# Patient Record
Sex: FEMALE | Race: WHITE | URBAN - METROPOLITAN AREA
[De-identification: names, ages, dates, MRNs, and addresses within clinical notes are randomized per-mention and may not be internally consistent; named-entity substitution may affect disease eponyms.]

---

## 2017-02-06 ENCOUNTER — ALLIED HEALTH/NURSE VISIT (OUTPATIENT)
Dept: OPHTHALMOLOGY | Facility: CLINIC | Age: 67
End: 2017-02-06

## 2017-02-06 DIAGNOSIS — H40.1131 PRIMARY OPEN ANGLE GLAUCOMA OF BOTH EYES, MILD STAGE: Primary | ICD-10-CM

## 2017-02-06 RX ORDER — TIMOLOL MALEATE 5 MG/ML
1 SOLUTION/ DROPS OPHTHALMIC DAILY
Qty: 1 BOTTLE | Refills: 11 | Status: SHIPPED | OUTPATIENT
Start: 2017-02-06 | End: 2017-11-06

## 2017-02-06 ASSESSMENT — REFRACTION_WEARINGRX
OD_SPHERE: +2.50
SPECS_TYPE: SVL
OD_CYLINDER: SPHERE
OS_SPHERE: +2.50
OS_CYLINDER: SPHERE

## 2017-02-06 ASSESSMENT — SLIT LAMP EXAM - LIDS
COMMENTS: NORMAL
COMMENTS: NORMAL

## 2017-02-06 ASSESSMENT — VISUAL ACUITY
CORRECTION_TYPE: CONTACTS
OS_CC+: -2
OD_CC: 20/20
OS_CC: 20/25
METHOD: SNELLEN - LINEAR

## 2017-02-06 ASSESSMENT — CONF VISUAL FIELD
OS_NORMAL: 1
METHOD: COUNTING FINGERS
OD_NORMAL: 1

## 2017-02-06 ASSESSMENT — EXTERNAL EXAM - LEFT EYE: OS_EXAM: NORMAL

## 2017-02-06 ASSESSMENT — PACHYMETRY
OS_CT(UM): 523
OD_CT(UM): 521
EXAM_DATE: 2010

## 2017-02-06 ASSESSMENT — CUP TO DISC RATIO
OD_RATIO: 0.65
OS_RATIO: 0.65

## 2017-02-06 ASSESSMENT — EXTERNAL EXAM - RIGHT EYE: OD_EXAM: NORMAL

## 2017-02-06 ASSESSMENT — TONOMETRY
OS_IOP_MMHG: 17
IOP_METHOD: APPLANATION
OD_IOP_MMHG: 18

## 2017-02-06 NOTE — NURSING NOTE
Chief Complaints and History of Present Illnesses   Patient presents with     Glaucoma Follow Up     4 month return for glaucoma suspect hx / VF today     HPI    Symptoms:     No floaters   No flashes   No redness   No eye discharge         Do you have eye pain now?:  No      Comments:  4 month return for glaucoma suspect hx / VF today.  Wearing GPCL with readers. States [facility] good / denies ocular discomfort.  Rina Justin COT 1:10 PM 02/06/2017

## 2017-02-06 NOTE — PROGRESS NOTES
Assessment & Plan      Alessandra Roman is a 66 year old female with the following diagnoses:   (H40.1131) Primary open angle glaucoma of both eyes, mild stage  (primary encounter diagnosis)  Comment: New diagnosis  Plan: Timolol 1/2 q AM     -----------------------------------------------------------------------------------      Patient disposition:   Return in about 4 months (around 6/6/2017) for Follow Up, Glaucoma. or sooner as needed.    I have confirmed the content of the chief complaint, history of present illness, review of systems, and past medical/surgical history sections and edited the information as needed.    Complete documentation of historical and exam elements from today's encounter can  be found in the full encounter summary report (not reduplicated in this progress  note). I personally obtained the chief complaint(s) and history of present illness. I  confirmed and edited as necessary the review of systems, past medical/surgical  history, family history, social history, and examination findings as documented by  others; and I examined the patient myself. I personally reviewed the relevant tests,  images, and reports as documented above. I formulated and edited as necessary the  assessment and plan and discussed the findings and management plan with the  patient and family.    ALAN Chambers M.D.

## 2017-02-06 NOTE — MR AVS SNAPSHOT
After Visit Summary   2017    Alessandra Roman    MRN: [id number]           Patient Information     Date Of Birth          1950        Visit Information        Provider Department      2017 1:00 PM Zeferino Chambers MD [facility]        Today's Diagnoses     Primary open angle glaucoma of both eyes, mild stage    -  1        Follow-ups after your visit        Follow-up notes from your care team     Return in about 4 months (around 2017) for Follow Up, Glaucoma.      Your next 10 appointments already scheduled     2017  9:20 AM   Return Visit with Zeferino Chambers MD   [facility])    [facility]  [address] [address] [phone]              Who to contact     Please call your clinic at [phone] to:    Ask questions about your health    Make or cancel appointments    Discuss your medicines    Learn about your test results    Speak to your doctor   If you have compliments or concerns about an experience at your clinic, or if you wish to file a complaint, please contact [facility] Patient Relations at [phone] or email us at [email or web address]         Additional Information About Your Visit        MyChart Information     [employer] is an electronic gateway that provides easy, online access to your medical records. With [employer], you can request a clinic appointment, read your test results, renew a prescription or communicate with your care team.     To sign up for Havgul Clean Energyt visit the website at [email or web address]   You will be asked to enter the access code listed below, as well as some personal information. Please follow the directions to create your username and password.     Your access code is: [email or web address]  Expires: 2017  2:32 PM     Your access code will  in 90 days.  If you need help or a new code, please contact your [facility] or call [phone] for assistance.        Care EveryWhere ID     This is your Care EveryWhere ID. This could be used by other organizations to access your [place] medical records  [id number]         Blood Pressure from Last 3 Encounters:   No data found for BP    Weight from Last 3 Encounters:   No data found for Wt              Today, you had the following     No orders found for display         Today's Medication Changes          These changes are accurate as of: 2/6/17  2:32 PM.  If you have any questions, ask your nurse or doctor.               Start taking these medicines.        Dose/Directions    timolol 0.5 % ophthalmic solution   Commonly known as:  TIMOPTIC   Used for:  Primary open angle glaucoma of both eyes, mild stage   Started by:  Zeferino Chambers MD        Dose:  1 drop   Place 1 drop into both eyes daily in the morning   Quantity:  1 Bottle   Refills:  11            Where to get your medicines      These medications were sent to [facility] [place], [place] - [address]  [address], [facility] [address]     Phone:  [phone]    - timolol 0.5 % ophthalmic solution             Primary Care Provider Office Phone # Fax #    Donny Siegel [phone]       [address]  [facility] [address]        Thank you!     Thank you for choosing [facility]  for your care. Our goal is always to provide you with excellent care. Hearing back from our patients is one way we can continue to improve our services. Please take a few minutes to complete the written survey that you may receive in the mail after your visit with us. Thank you!             Your Updated Medication List - Protect others around you: Learn how to safely use, store and throw away your medicines at www.disposemymeds.org.          This  list is accurate as of: 2/6/17  2:32 PM.  Always use your most recent med list.                   Brand Name Dispense Instructions for use    insulin  UNIT/ML injection    HumuLIN N/NovoLIN N     Inject Subcutaneous 2 times daily (before meals)       LISINOPRIL PO          SIMVASTATIN PO          timolol 0.5 % ophthalmic solution    TIMOPTIC    1 Bottle    Place 1 drop into both eyes daily in the morning

## 2017-03-31 ENCOUNTER — OFFICE VISIT (OUTPATIENT)
Dept: OPHTHALMOLOGY | Facility: CLINIC | Age: 67
End: 2017-03-31

## 2017-03-31 DIAGNOSIS — H52.13 MYOPIA, BILATERAL: Primary | ICD-10-CM

## 2017-03-31 NOTE — MR AVS SNAPSHOT
After Visit Summary   3/31/2017    Alessandra Roman    MRN: [id number]           Patient Information     Date Of Birth          1950        Visit Information        Provider Department      3/31/2017 6:00 PM [facility]        Today's Diagnoses     Myopia, bilateral    -  1       Follow-ups after your visit        Your next 10 appointments already scheduled     2017  9:20 AM CDT   Return Visit with Zeferino Chambers MD   [facility] ([facility])    [facility]  [address] [id number] [phone]              Who to contact     Please call your clinic at [phone] to:    Ask questions about your health    Make or cancel appointments    Discuss your medicines    Learn about your test results    Speak to your doctor   If you have compliments or concerns about an experience at your clinic, or if you wish to file a complaint, please contact [facility] Patient Relations at [phone] or email us at [email or web address]         Additional Information About Your Visit        MyChart Information     [employer] Project[employer] is an electronic gateway that provides easy, online access to your medical records. With S[employer]f, you can request a clinic appointment, read your test results, renew a prescription or communicate with your care team.     To sign up for [employer]t visit the website at [email or web address]   You will be asked to enter the access code listed below, as well as some personal information. Please follow the directions to create your username and password.     Your access code is: [email or web address]  Expires: 2017  3:32 PM     Your access code will  in 90 days. If you need help or a new code, please contact your [facility] or call [phone] for assistance.        Care EveryWhere  ID     This is your Care EveryWhere ID. This could be used by other organizations to access your [place] medical records  [id number]         Blood Pressure from Last 3 Encounters:   No data found for BP    Weight from Last 3 Encounters:   No data found for Wt              Today, you had the following     No orders found for display       Primary Care Provider Office Phone # Fax #    Donny Siegel [phone]       [address] [place] [address]        Thank you!     Thank you for choosing [facility]  for your care. Our goal is always to provide you with excellent care. Hearing back from our patients is one way we can continue to improve our services. Please take a few minutes to complete the written survey that you may receive in the mail after your visit with us. Thank you!             Your Updated Medication List - Protect others around you: Learn how to safely use, store and throw away your medicines at www.disposemymeds.org.          This list is accurate as of: 3/31/17 11:59 PM.  Always use your most recent med list.                   Brand Name Dispense Instructions for use    insulin  UNIT/ML injection    HumuLIN N/NovoLIN N     Inject Subcutaneous 2 times daily (before meals)       LISINOPRIL PO          SIMVASTATIN PO          timolol 0.5 % ophthalmic solution    TIMOPTIC    1 Bottle    Place 1 drop into both eyes daily in the morning

## 2017-04-05 NOTE — PROGRESS NOTES
Contact Lens Billing  V-Code:  - Soft spherical  Price per Unit: $80x2    These are for cosmetic contact lenses.    Encounter Diagnosis   Name Primary?     Myopia, bilateral Yes        Date of last eye exam:

## 2017-06-19 ENCOUNTER — OFFICE VISIT (OUTPATIENT)
Dept: OPHTHALMOLOGY | Facility: CLINIC | Age: 67
End: 2017-06-19

## 2017-06-19 DIAGNOSIS — H40.1131 PRIMARY OPEN ANGLE GLAUCOMA OF BOTH EYES, MILD STAGE: Primary | ICD-10-CM

## 2017-06-19 ASSESSMENT — CUP TO DISC RATIO
OS_RATIO: 0.65
OD_RATIO: 0.65

## 2017-06-19 ASSESSMENT — VISUAL ACUITY
OD_CC: 20/20-2
METHOD: SNELLEN - LINEAR
OS_CC: 20/25-2
CORRECTION_TYPE: CONTACTS

## 2017-06-19 ASSESSMENT — CONF VISUAL FIELD
METHOD: COUNTING FINGERS
OS_NORMAL: 1
OD_NORMAL: 1

## 2017-06-19 ASSESSMENT — SLIT LAMP EXAM - LIDS
COMMENTS: NORMAL
COMMENTS: NORMAL

## 2017-06-19 ASSESSMENT — PACHYMETRY
OD_CT(UM): 521
OS_CT(UM): 523
EXAM_DATE: 2010

## 2017-06-19 ASSESSMENT — TONOMETRY
OD_IOP_MMHG: 15
IOP_METHOD: APPLANATION
OS_IOP_MMHG: 17

## 2017-06-19 ASSESSMENT — EXTERNAL EXAM - RIGHT EYE: OD_EXAM: NORMAL

## 2017-06-19 ASSESSMENT — EXTERNAL EXAM - LEFT EYE: OS_EXAM: NORMAL

## 2017-06-19 NOTE — PROGRESS NOTES
Assessment & Plan      Alessandra Roman is a 67 year old female with the following diagnoses:   (H40.1131) Primary open angle glaucoma of both eyes, mild stage  (primary encounter diagnosis)  Comment: Good response to Timolol OU  Plan: Cont T 1/2     -----------------------------------------------------------------------------------      Patient disposition:   Return in about 4 months (around 10/19/2017) for Follow Up, Glaucoma, OCT. or sooner as needed.    Complete documentation of historical and exam elements from today's encounter can  be found in the full encounter summary report (not reduplicated in this progress  note). I personally obtained the chief complaint(s) and history of present illness. I  confirmed and edited as necessary the review of systems, past medical/surgical  history, family history, social history, and examination findings as documented by  others; and I examined the patient myself. I personally reviewed the relevant tests,  images, and reports as documented above. I formulated and edited as necessary the  assessment and plan and discussed the findings and management plan with the  patient and family.    ALAN Chambers M.D

## 2017-06-19 NOTE — MR AVS SNAPSHOT
After Visit Summary   2017    Alessandra Roman    MRN: [id number]           Patient Information     Date Of Birth          1950        Visit Information        Provider Department      2017 9:20 AM Zeferino Chambers MD [facility]        Today's Diagnoses     Primary open angle glaucoma of both eyes, mild stage    -  1       Follow-ups after your visit        Follow-up notes from your care team     Return in about 4 months (around 10/19/2017) for Follow Up, Glaucoma, OCT.      Your next 10 appointments already scheduled     2017  8:40 AM CST   Return Visit with Zeferino Chambers MD   [facility] ([facility])    [facility]  [address] [address] [phone]              Who to contact     Please call your clinic at [phone] to:    Ask questions about your health    Make or cancel appointments    Discuss your medicines    Learn about your test results    Speak to your doctor   If you have compliments or concerns about an experience at your clinic, or if you wish to file a complaint, please contact [facility] Patient Relations at [phone] or email us at [email or web address]         Additional Information About Your Visit        MyChart Information     [employer]c is an electronic gateway that provides easy, online access to your medical records. With Plastyc, you can request a clinic appointment, read your test results, renew a prescription or communicate with your care team.     To sign up for 20:20 Mobilet visit the website at [email or web address]   You will be asked to enter the access code listed below, as well as some personal information. Please follow the directions to create your username and password.     Your access code is: [email or web address]  Expires: 9/3/2017  6:31 AM     Your access code will   in 90 days. If you need help or a new code, please contact your [facility] or call [phone] for assistance.        Care EveryWhere ID     This is your Care EveryWhere ID. This could be used by other organizations to access your [place] medical records  [id number]         Blood Pressure from Last 3 Encounters:   No data found for BP    Weight from Last 3 Encounters:   No data found for Wt              Today, you had the following     No orders found for display       Primary Care Provider Office Phone # Fax #    Donny Siegel [phone]       [address]  [facility] [address]        Thank you!     Thank you for choosing [facility]  for your care. Our goal is always to provide you with excellent care. Hearing back from our patients is one way we can continue to improve our services. Please take a few minutes to complete the written survey that you may receive in the mail after your visit with us. Thank you!             Your Updated Medication List - Protect others around you: Learn how to safely use, store and throw away your medicines at www.disposemymeds.org.          This list is accurate as of: 6/19/17 10:18 AM.  Always use your most recent med list.                   Brand Name Dispense Instructions for use    insulin  UNIT/ML injection    HumuLIN N/NovoLIN N     Inject Subcutaneous 2 times daily (before meals)       LISINOPRIL PO          SIMVASTATIN PO          timolol 0.5 % ophthalmic solution    TIMOPTIC    1 Bottle    Place 1 drop into both eyes daily in the morning

## 2017-06-19 NOTE — NURSING NOTE
Chief Complaints and History of Present Illnesses   Patient presents with     Glaucoma Follow Up     HPI    Affected eye(s):  Both   Symptoms:     Difficulty with reading (Comment: interested in getting bifocal contacts but will call to make an appointment for a lens fitting when she is ready)      Duration:  3 months   Frequency:  Constant       Do you have eye pain now?:  No      Comments:  Using timolol each morning BE - last used @ 7:30am    JANELLE Rodas 9:27 AM 06/19/2017

## 2017-10-24 ENCOUNTER — TELEPHONE (OUTPATIENT)
Dept: OPHTHALMOLOGY | Facility: CLINIC | Age: 67
End: 2017-10-24

## 2017-11-06 ENCOUNTER — OFFICE VISIT (OUTPATIENT)
Dept: OPHTHALMOLOGY | Facility: CLINIC | Age: 67
End: 2017-11-06

## 2017-11-06 DIAGNOSIS — H40.1131 PRIMARY OPEN ANGLE GLAUCOMA OF BOTH EYES, MILD STAGE: Primary | ICD-10-CM

## 2017-11-06 RX ORDER — TIMOLOL MALEATE 5 MG/ML
1 SOLUTION/ DROPS OPHTHALMIC DAILY
Qty: 3 BOTTLE | Refills: 3 | Status: SHIPPED | OUTPATIENT
Start: 2017-11-06 | End: 2018-12-21

## 2017-11-06 ASSESSMENT — CONF VISUAL FIELD
OD_NORMAL: 1
OS_NORMAL: 1

## 2017-11-06 ASSESSMENT — PACHYMETRY
OD_CT(UM): 521
OS_CT(UM): 523
EXAM_DATE: 2010

## 2017-11-06 ASSESSMENT — EXTERNAL EXAM - RIGHT EYE: OD_EXAM: NORMAL

## 2017-11-06 ASSESSMENT — TONOMETRY
OS_IOP_MMHG: 18
OD_IOP_MMHG: 16
IOP_METHOD: APPLANATION

## 2017-11-06 ASSESSMENT — SLIT LAMP EXAM - LIDS
COMMENTS: NORMAL
COMMENTS: NORMAL

## 2017-11-06 ASSESSMENT — CUP TO DISC RATIO
OS_RATIO: 0.65
OD_RATIO: 0.65

## 2017-11-06 ASSESSMENT — EXTERNAL EXAM - LEFT EYE: OS_EXAM: NORMAL

## 2017-11-06 ASSESSMENT — VISUAL ACUITY
OD_CC: 20/25
OS_CC: 20/30
CORRECTION_TYPE: CONTACTS
METHOD: SNELLEN - LINEAR

## 2017-11-06 NOTE — NURSING NOTE
"Chief Complaints and History of Present Illnesses   Patient presents with     Glaucoma Follow Up     OCT     HPI    Symptoms:        Duration:  4 months   Frequency:  Constant       Do you have eye pain now?:  No      Comments:  Pt states Timolol not lasting for the \"48 days\"  Timolol BE QAM(7am)  Pt needs fitting for Bifocal contacts and complete  With Dr Echols.  Changed to 90 day supply may help to get more  Ewelina Dorsey COT 8:55 AM November 6, 2017                  "

## 2017-11-06 NOTE — MR AVS SNAPSHOT
After Visit Summary   11/6/2017    Alessandra Roman    MRN: [id number]           Patient Information     Date Of Birth          1950        Visit Information        Provider Department      11/6/2017 8:40 AM Zeferino Chambers MD [facility]        Today's Diagnoses     Primary open angle glaucoma of both eyes, mild stage    -  1       Follow-ups after your visit        Follow-up notes from your care team     Return in about 4 months (around 3/6/2018) for Complete Eye Exam.      Your next 10 appointments already scheduled     Nov 30, 2017  8:20 AM CST   Complete Exam with Meliton Echols, PERLA   [facility] ([facility])    [facility]  [address]  [facility] [address] [phone]            Mar 12, 2018  8:20 AM CDT   Complete Exam with Zeferino Chambers MD   [facility] ([facility])    [facility]  [address]  [facility] [address]   [phone]              Who to contact     Please call your clinic at [phone] to:    Ask questions about your health    Make or cancel appointments    Discuss your medicines    Learn about your test results    Speak to your doctor   If you have compliments or concerns about an experience at your clinic, or if you wish to file a complaint, please contact [facility] Patient Relations at [phone] or email us at [email or web address]         Additional Information About Your Visit        MyChart Information     Citizen[employer] is an electronic gateway that provides easy, online access to your medical records. With CitizenShipper, you can request a clinic appointment, read your test results, renew a prescription or communicate with your care team.     To sign up for Renegade Gamest visit the website at [email or web address]   You will be asked to  enter the access code listed below, as well as some personal information. Please follow the directions to create your username and password.     Your access code is: [email or web address]  Expires: 2018  5:30 AM     Your access code will  in 90 days. If you need help or a new code, please contact your [facility] or call [phone] for assistance.        Care EveryWhere ID     This is your Care EveryWhere ID. This could be used by other organizations to access your [place] medical records  [id number]         Blood Pressure from Last 3 Encounters:   No data found for BP    Weight from Last 3 Encounters:   No data found for Wt              Today, you had the following     No orders found for display         Where to get your medicines      These medications were sent to [facility] [place], [place] - [address]  [address] [facility] [address], [facility] [address]     Phone:  [phone]     timolol 0.5 % ophthalmic solution          Primary Care Provider Office Phone # Fax #    Donny Siegel [phone]       [address]  [facility] [address]        Equal Access to Services     [facility] Hospital[facility] AH: Hadii aad ku hadasho Soomaali, waaxda luqadaha, qaybta kaalmada adeegyada, waxay idiin hayaan vishal salomon. So [facility] [phone].    ATENCIÓN: Si habla español, tiene a mckeon disposición servicios gratuitos de asistencia lingüística. Llame al [phone].    We comply with applicable federal civil rights laws and [place] laws. We do not discriminate on the basis of race, color, national origin, age, disability, sex, sexual orientation, or gender identity.            Thank you!     Thank you for choosing [facility]  for your care. Our goal is always to provide you with excellent care. Hearing back from our patients is one way we can continue to improve our services. Please take a few minutes to  complete the written survey that you may receive in the mail after your visit with us. Thank you!             Your Updated Medication List - Protect others around you: Learn how to safely use, store and throw away your medicines at www.disposemymeds.org.          This list is accurate as of: 11/6/17  9:54 AM.  Always use your most recent med list.                   Brand Name Dispense Instructions for use Diagnosis    insulin  UNIT/ML injection    HumuLIN N/NovoLIN N     Inject Subcutaneous 2 times daily (before meals)        LISINOPRIL PO           SIMVASTATIN PO           timolol 0.5 % ophthalmic solution    TIMOPTIC    3 Bottle    Place 1 drop into both eyes daily in the morning    Primary open angle glaucoma of both eyes, mild stage

## 2017-11-06 NOTE — NURSING NOTE
"Chief Complaints and History of Present Illnesses   Patient presents with     Glaucoma Follow Up     OCT     HPI    Affected eye(s):  Both   Symptoms:        Duration:  4 months   Frequency:  Constant       Do you have eye pain now?:  No      Comments:  Pt states Timolol not lasting for the \"48 days\"  Timolol BE QAM(7am)  Pt needs fitting for Bifocal contacts and complete AODM exam  With Dr Echols.  Changed to 90 day supply may help to get more  Ewelina Dorsey COT 8:55 AM November 6, 2017                  "

## 2017-11-07 NOTE — PROGRESS NOTES
Assessment & Plan      Alessandra Roman is a 67 year old female with the following diagnoses:   (H40.4671) Primary open angle glaucoma of both eyes, mild stage  (primary encounter diagnosis)  Comment: Stable, good control  Plan: timolol (TIMOPTIC) 0.5 % ophthalmic solution,         OCT Optic Nerve RNFL Spectralis OU (both eyes)        Normal OU     -----------------------------------------------------------------------------------      Patient disposition:   Return in about 4 months (around 3/6/2018) for Complete Eye Exam. or sooner as needed.    Complete documentation of historical and exam elements from today's encounter can  be found in the full encounter summary report (not reduplicated in this progress  note). I personally obtained the chief complaint(s) and history of present illness. I  confirmed and edited as necessary the review of systems, past medical/surgical  history, family history, social history, and examination findings as documented by  others; and I examined the patient myself. I personally reviewed the relevant tests,  images, and reports as documented above. I formulated and edited as necessary the  assessment and plan and discussed the findings and management plan with the  patient and family.    ALAN Chambers M.D

## 2017-12-05 ENCOUNTER — OFFICE VISIT (OUTPATIENT)
Dept: OPTOMETRY | Facility: CLINIC | Age: 67
End: 2017-12-05

## 2017-12-05 DIAGNOSIS — H52.13 MYOPIA WITH PRESBYOPIA OF BOTH EYES: Primary | ICD-10-CM

## 2017-12-05 DIAGNOSIS — H52.4 MYOPIA WITH PRESBYOPIA OF BOTH EYES: Primary | ICD-10-CM

## 2017-12-05 ASSESSMENT — KERATOMETRY
METHOD_AUTO_MANUAL: TOPO
OS_K2POWER_DIOPTERS: 44.25
OS_K1POWER_DIOPTERS: 43.00
OD_K1POWER_DIOPTERS: 43.50
OD_AXISANGLE2_DEGREES: 5
OD_K2POWER_DIOPTERS: 44.50
OS_AXISANGLE2_DEGREES: 179
OS_AXISANGLE_DEGREES: 89
OD_AXISANGLE_DEGREES: 95

## 2017-12-05 ASSESSMENT — REFRACTION_CURRENTRX
OD_SPHERE: -3.50
OS_DIAMETER: 9.0
OD_BASECURVE: 43.25
OD_DIAMETER: 9.0
OS_SPHERE: -4.25
OD_BRAND: BOSTON
OS_BRAND: BOSTON
OS_BASECURVE: 43.25
OS_ADDL_SPECS: BLUE

## 2017-12-05 ASSESSMENT — EXTERNAL EXAM - LEFT EYE: OS_EXAM: NORMAL

## 2017-12-05 ASSESSMENT — SLIT LAMP EXAM - LIDS
COMMENTS: NORMAL
COMMENTS: NORMAL

## 2017-12-05 ASSESSMENT — VISUAL ACUITY
CORRECTION_TYPE: CONTACTS
OD_CC: 20/25
OS_CC: 20/25-
METHOD: SNELLEN - LINEAR

## 2017-12-05 ASSESSMENT — PACHYMETRY
OS_CT(UM): 523
EXAM_DATE: 2010
OD_CT(UM): 521

## 2017-12-05 ASSESSMENT — REFRACTION_MANIFEST
OD_CYLINDER: SPHERE
OD_SPHERE: -3.50
OS_SPHERE: -4.00
OS_CYLINDER: SPHERE

## 2017-12-05 ASSESSMENT — EXTERNAL EXAM - RIGHT EYE: OD_EXAM: NORMAL

## 2017-12-05 NOTE — PROGRESS NOTES
A/P  1.) Myopia/Presbyopia OU  -Longtime RGP wearer, currently in DVO with good comfort/fit  -Previous monovision wearer, did well until add got too high  -Reasonable to try multifocals again - expectations reviewed. She is okay wearing readers for fine print.    Order lenses, mail to pt. RTC 3-4 weeks f/u for lens adjustment    Com[facility]ecare at [facility]    Contact Lens Billing  V-Code:  - GP bifocal  Final Contact Lens Rx      Brand Base Curve Diameter Sphere Add Lens   Right Reclaim HD 7.75 9.2 -3.50 +2.50 [place] XO green   Left Reclaim HD 7.75 9.2 -4.00 +2.50 [place] XO blue            # of units: 2  Price per Unit: $150    These are for cosmetic contact lenses.    Encounter Diagnosis   Name Primary?     Myopia with presbyopia of both eyes Yes

## 2017-12-05 NOTE — MR AVS SNAPSHOT
After Visit Summary   2017    Alessandra Roman    MRN: [id number]           Patient Information     Date Of Birth          1950        Visit Information        Provider Department      2017 10:00 AM Donna Castillo, [facility]        Today's Diagnoses     Myopia with presbyopia of both eyes    -  1       Follow-ups after your visit        Your next 10 appointments already scheduled     Mar 12, 2018  8:20 AM CDT   Complete Exam with Zeferino Chambers MD   [facility])    [facility]  [address] [id number] [phone]              Who to contact     Please call your clinic at [phone] to:    Ask questions about your health    Make or cancel appointments    Discuss your medicines    Learn about your test results    Speak to your doctor   If you have compliments or concerns about an experience at your clinic, or if you wish to file a complaint, please contact [facility] Patient Relations at [phone] or email us at [email or web address]         Additional Information About Your Visit        MyChart Information     Backtrace I/Ot is an electronic gateway that provides easy, online access to your medical records. With [employer], you can request a clinic appointment, read your test results, renew a prescription or communicate with your care team.     To sign up for Backtrace I/Ot visit the website at [email or web address]   You will be asked to enter the access code listed below, as well as some personal information. Please follow the directions to create your username and password.     Your access code is: [email or web address]  Expires: 2018  5:30 AM     Your access code will  in 90 days. If you need help or a new code, please contact your [facility] or call [phone] for assistance.        Care  EveryWhere ID     This is your Care EveryWhere ID. This could be used by other organizations to access your [place] medical records  [id number]         Blood Pressure from Last 3 Encounters:   No data found for BP    Weight from Last 3 Encounters:   No data found for Wt              We Performed the Following     HC CONTACT LENS FITTING COSMETIC LVL 3 (24485.013)        Primary Care Provider Office Phone # Fax #    Donny Siegel [phone]       [address] [place] [address]        Equal Access to Services     [facility]: Hadii aad ku hadasho Soomaali, waaxda luqadaha, qaybta kaalmada adeegyada, waxay idiin hayaan adeeg axelaraantonella randall . So [facility] [phone].    ATENCIÓN: Si habla español, tiene a mckeon disposición servicios gratuitos de asistencia lingüística. [place] [facility] [phone].    We comply with applicable federal civil rights laws and [place] laws. We do not discriminate on the basis of race, color, national origin, age, disability, sex, sexual orientation, or gender identity.            Thank you!     Thank you for choosing EYE CLINIC  for your care. Our goal is always to provide you with excellent care. Hearing back from our patients is one way we can continue to improve our services. Please take a few minutes to complete the written survey that you may receive in the mail after your visit with us. Thank you!             Your Updated Medication List - Protect others around you: Learn how to safely use, store and throw away your medicines at www.disposemymeds.org.          This list is accurate as of: 12/5/17 11:09 AM.  Always use your most recent med list.                   Brand Name Dispense Instructions for use Diagnosis    HUMALOG SC           LISINOPRIL PO           SIMVASTATIN PO           timolol 0.5 % ophthalmic solution    TIMOPTIC    3 Bottle    Place 1 drop into both eyes daily in the morning    Primary open angle glaucoma of both eyes, mild  stage

## 2018-01-30 ENCOUNTER — OFFICE VISIT (OUTPATIENT)
Dept: OPTOMETRY | Facility: CLINIC | Age: 68
End: 2018-01-30
Payer: COMMERCIAL

## 2018-01-30 DIAGNOSIS — H52.4 MYOPIA WITH PRESBYOPIA OF BOTH EYES: Primary | ICD-10-CM

## 2018-01-30 DIAGNOSIS — H52.13 MYOPIA WITH PRESBYOPIA OF BOTH EYES: Primary | ICD-10-CM

## 2018-01-30 ASSESSMENT — REFRACTION_CURRENTRX
OD_BRAND: RECLAIM HD
OD_SPHERE: -3.50
OS_BASECURVE: 7.75
OD_BASECURVE: 7.75
OS_DIAMETER: 9.2
OD_ADD: +2.50
OS_BRAND: RECLAIM HD
OD_DIAMETER: 9.2
OS_ADD: +2.50
OS_SPHERE: -4.00

## 2018-01-30 ASSESSMENT — EXTERNAL EXAM - RIGHT EYE: OD_EXAM: NORMAL

## 2018-01-30 ASSESSMENT — VISUAL ACUITY
METHOD: SNELLEN - LINEAR
CORRECTION_TYPE: CONTACTS
OS_CC: 20/25
OD_CC: 0.75M
OS_CC: 0.75M
OD_CC: 20/25

## 2018-01-30 ASSESSMENT — EXTERNAL EXAM - LEFT EYE: OS_EXAM: NORMAL

## 2018-01-30 ASSESSMENT — SLIT LAMP EXAM - LIDS
COMMENTS: NORMAL
COMMENTS: NORMAL

## 2018-01-30 NOTE — MR AVS SNAPSHOT
After Visit Summary   2018    Alessandra Roman    MRN: [id number]           Patient Information     Date Of Birth          1950        Visit Information        Provider Department      2018 9:30 AM Donna Castillo, [facility]        Today's Diagnoses     Myopia with presbyopia of both eyes    -  1       Follow-ups after your visit        Your next 10 appointments already scheduled     Mar 12, 2018  8:20 AM CDT   Complete Exam with Zeferino Chambers MD   [facility])    [facility]  [address] [id number] [phone]              Who to contact     Please call your clinic at [phone] to:    Ask questions about your health    Make or cancel appointments    Discuss your medicines    Learn about your test results    Speak to your doctor   If you have compliments or concerns about an experience at your clinic, or if you wish to file a complaint, please contact [facility] Patient Relations at [phone] or email us at [email or web address]         Additional Information About Your Visit        MyChart Information     MetaIntellt is an electronic gateway that provides easy, online access to your medical records. With KidZui, you can request a clinic appointment, read your test results, renew a prescription or communicate with your care team.     To sign up for MetaIntellt visit the website at [email or web address]   You will be asked to enter the access code listed below, as well as some personal information. Please follow the directions to create your username and password.     Your access code is: [email or web address]  Expires: 2018  9:59 AM     Your access code will  in 90 days. If you need help or a new code, please contact your [facility] or call [phone] for assistance.        Care  EveryWhere ID     This is your Care EveryWhere ID. This could be used by other organizations to access your [place] medical records  [id number]         Blood Pressure from Last 3 Encounters:   No data found for BP    Weight from Last 3 Encounters:   No data found for Wt              Today, you had the following     No orders found for display       Primary Care Provider Office Phone # Fax #    Donny Siegel [phone]       [address] [place] [address]        Equal Access to Services     TORI PEREZ : Hadii aad ku hadasho Soomaali, waaxda luqadaha, qaybta kaalmada adeegyada, waxay idiin hayaan adeeg kharash la'cristiano . So [facility] [phone].    ATENCIÓN: Si habla español, tiene a mckeon disposición servicios gratuitos de asistencia lingüística. [facility] [phone].    We comply with applicable federal civil rights laws and [place] laws. We do not discriminate on the basis of race, color, national origin, age, disability, sex, sexual orientation, or gender identity.            Thank you!     Thank you for choosing EYE CLINIC  for your care. Our goal is always to provide you with excellent care. Hearing back from our patients is one way we can continue to improve our services. Please take a few minutes to complete the written survey that you may receive in the mail after your visit with us. Thank you!             Your Updated Medication List - Protect others around you: Learn how to safely use, store and throw away your medicines at www.disposemymeds.org.          This list is accurate as of 1/30/18  9:59 AM.  Always use your most recent med list.                   Brand Name Dispense Instructions for use Diagnosis    HUMALOG SC           LISINOPRIL PO           SIMVASTATIN PO           timolol 0.5 % ophthalmic solution    TIMOPTIC    3 Bottle    Place 1 drop into both eyes daily in the morning    Primary open angle glaucoma of both eyes, mild stage

## 2018-03-12 ENCOUNTER — OFFICE VISIT (OUTPATIENT)
Dept: OPHTHALMOLOGY | Facility: CLINIC | Age: 68
End: 2018-03-12
Payer: COMMERCIAL

## 2018-03-12 DIAGNOSIS — H25.10 SENILE NUCLEAR SCLEROSIS, UNSPECIFIED LATERALITY: ICD-10-CM

## 2018-03-12 DIAGNOSIS — H40.1131 PRIMARY OPEN ANGLE GLAUCOMA OF BOTH EYES, MILD STAGE: Primary | ICD-10-CM

## 2018-03-12 RX ORDER — LISINOPRIL 10 MG/1
10 TABLET ORAL
COMMUNITY
Start: 2017-10-24

## 2018-03-12 RX ORDER — ESZOPICLONE 2 MG/1
2 TABLET, FILM COATED ORAL
COMMUNITY
Start: 2015-07-29

## 2018-03-12 RX ORDER — VENLAFAXINE HYDROCHLORIDE 75 MG/1
75 CAPSULE, EXTENDED RELEASE ORAL
COMMUNITY
Start: 2017-10-24

## 2018-03-12 RX ORDER — SIMVASTATIN 20 MG
20 TABLET ORAL
COMMUNITY
Start: 2017-10-24

## 2018-03-12 ASSESSMENT — CUP TO DISC RATIO
OD_RATIO: 0.65
OS_RATIO: 0.7

## 2018-03-12 ASSESSMENT — REFRACTION_WEARINGRX
OD_SPHERE: +2.50
OS_AXIS: 085
OD_SPHERE: -3.75
OS_SPHERE: -5.25
OD_CYLINDER: SPHERE
OS_ADD: +2.50
OS_CYLINDER: +1.00
SPECS_TYPE: SVL
OD_ADD: +2.50
OD_AXIS: 014
OS_CYLINDER: SPHERE
OS_SPHERE: +2.50
SPECS_TYPE: PAL
OD_CYLINDER: +0.50

## 2018-03-12 ASSESSMENT — CONF VISUAL FIELD
OS_NORMAL: 1
OD_NORMAL: 1

## 2018-03-12 ASSESSMENT — TONOMETRY
OS_IOP_MMHG: 18
IOP_METHOD: APPLANATION
OD_IOP_MMHG: 19
OS_IOP_MMHG: 19
IOP_METHOD: ICARE
OD_IOP_MMHG: 20

## 2018-03-12 ASSESSMENT — EXTERNAL EXAM - LEFT EYE: OS_EXAM: NORMAL

## 2018-03-12 ASSESSMENT — PACHYMETRY
EXAM_DATE: 2010
OS_CT(UM): 523
OD_CT(UM): 521

## 2018-03-12 ASSESSMENT — SLIT LAMP EXAM - LIDS
COMMENTS: NORMAL
COMMENTS: NORMAL

## 2018-03-12 ASSESSMENT — EXTERNAL EXAM - RIGHT EYE: OD_EXAM: NORMAL

## 2018-03-12 NOTE — MR AVS SNAPSHOT
After Visit Summary   3/12/2018    Alessandra Roman    MRN: [id number]           Patient Information     Date Of Birth          1950        Visit Information        Provider Department      3/12/2018 10:40 AM Zeferino Chambers MD [facility]        Today's Diagnoses     Primary open angle glaucoma of both eyes, mild stage    -  1    Senile nuclear sclerosis, unspecified laterality - Both Eyes           Follow-ups after your visit        Follow-up notes from your care team     Return in about 4 months (around 2018) for Follow Up, Glaucoma, VF's.      Who to contact     Please call your clinic at [phone] to:    Ask questions about your health    Make or cancel appointments    Discuss your medicines    Learn about your test results    Speak to your doctor            Additional Information About Your Visit        MyChart Information     U*tique is an electronic gateway that provides easy, online access to your medical records. With U*tique, you can request a clinic appointment, read your test results, renew a prescription or communicate with your care team.     To sign up for CalmSeat visit the website at [email or web address]   You will be asked to enter the access code listed below, as well as some personal information. Please follow the directions to create your username and password.     Your access code is: [email or web address]  Expires: 2018 10:59 AM     Your access code will  in 90 days. If you need help or a new code, please contact your [facility] or call [phone] for assistance.        Care EveryWhere ID     This is your Care EveryWhere ID. This could be used by other organizations to access your [place] medical records  [id number]         Blood Pressure from Last 3 Encounters:   No data found for BP    Weight from Last 3 Encounters:   No data found for Wt              Today, you had the following      No orders found for display       Primary Care Provider Office Phone # Fax #    Donny Siegel [phone]       [address]  [facility] [address]        Equal Access to Services     JOHN PEREZ : Jerrell cheryl pro slyo Sostefaniaali, waaxda luqadaha, qaybta kaalmada adeegyada, emily bailey tonia salomon. So [facility] [phone].    ATENCIÓN: Si habla español, tiene a mckeon disposición servicios gratuitos de asistencia lingüística. Llame al [phone].    We comply with applicable federal civil rights laws and [place] laws. We do not discriminate on the basis of race, color, national origin, age, disability, sex, sexual orientation, or gender identity.            Thank you!     Thank you for choosing [facility]  for your care. Our goal is always to provide you with excellent care. Hearing back from our patients is one way we can continue to improve our services. Please take a few minutes to complete the written survey that you may receive in the mail after your visit with us. Thank you!             Your Updated Medication List - Protect others around you: Learn how to safely use, store and throw away your medicines at www.disposemymeds.org.          This list is accurate as of 3/12/18  5:35 PM.  Always use your most recent med list.                   Brand Name Dispense Instructions for use Diagnosis    eszopiclone 2 MG tablet    LUNESTA     Take 2 mg by mouth        HUMALOG SC           lisinopril 10 MG tablet    PRINIVIL/ZESTRIL     Take 10 mg by mouth        simvastatin 20 MG tablet    ZOCOR     Take 20 mg by mouth        timolol 0.5 % ophthalmic solution    TIMOPTIC    3 Bottle    Place 1 drop into both eyes daily in the morning    Primary open angle glaucoma of both eyes, mild stage       venlafaxine 75 MG 24 hr capsule    EFFEXOR-XR     Take 75 mg by mouth

## 2018-03-12 NOTE — NURSING NOTE
Chief Complaints and History of Present Illnesses   Patient presents with     Eye Exam For Diabetes     Glaucoma F/U     HPI    Affected eye(s):  Both   Symptoms:        Duration:  1 year   Frequency:  Constant       Do you have eye pain now?:  No      Comments:  Dr Jonathan Cerda  She did manifest at the 12/5/17 visit  But no glasses RX.  Using Timolo1/2 percent OU BE in the morning  Did not put in for the last 3 days out of town  Last A1C: 7.4  Ewelina Dorsey COT 10:57 AM March 12, 2018

## 2018-03-12 NOTE — PROGRESS NOTES
Assessment & Plan      Alessandra Roman is a 67 year old female with the following diagnoses:   (H40.1131) Primary open angle glaucoma of both eyes, mild stage  (primary encounter diagnosis)  Comment: Good control  Plan: Continue Timolol OU    (H25.10) Senile nuclear sclerosis, unspecified laterality - Both Eyes  Comment: Mild   Plan: Follow     -----------------------------------------------------------------------------------      Patient disposition:   Return in about 4 months (around 7/12/2018) for Follow Up, Glaucoma, VF's. or sooner as needed.    Complete documentation of historical and exam elements from today's encounter can  be found in the full encounter summary report (not reduplicated in this progress  note). I personally obtained the chief complaint(s) and history of present illness. I  confirmed and edited as necessary the review of systems, past medical/surgical  history, family history, social history, and examination findings as documented by  others; and I examined the patient myself. I personally reviewed the relevant tests,  images, and reports as documented above. I formulated and edited as necessary the  assessment and plan and discussed the findings and management plan with the  patient and family.    ALAN Chambers M.D

## 2018-03-12 NOTE — NURSING NOTE
Chief Complaints and History of Present Illnesses   Patient presents with     Glaucoma Follow Up     complete today     HPI    Symptoms:              Comments:  Dr Castillo fit [employer]  She did manifest at the 12/5/17 visit  But no glasses RX.  Using Timolo1/2 percent OU BE in the morning  Did not put in for the last 3 days out of town  Ewelina Dorsey COT 10:57 AM March 12, 2018

## 2018-11-23 ENCOUNTER — OFFICE VISIT (OUTPATIENT)
Dept: OPHTHALMOLOGY | Facility: CLINIC | Age: 68
End: 2018-11-23
Payer: COMMERCIAL

## 2018-11-23 DIAGNOSIS — H40.1131 PRIMARY OPEN ANGLE GLAUCOMA (POAG) OF BOTH EYES, MILD STAGE: Primary | ICD-10-CM

## 2018-11-23 DIAGNOSIS — H25.13 NUCLEAR SCLEROTIC CATARACT OF BOTH EYES: ICD-10-CM

## 2018-11-23 RX ORDER — LATANOPROST 50 UG/ML
1 SOLUTION/ DROPS OPHTHALMIC AT BEDTIME
Qty: 1 BOTTLE | Refills: 11 | Status: SHIPPED | OUTPATIENT
Start: 2018-11-23 | End: 2018-12-21

## 2018-11-23 ASSESSMENT — EXTERNAL EXAM - LEFT EYE: OS_EXAM: NORMAL

## 2018-11-23 ASSESSMENT — REFRACTION_WEARINGRX
SPECS_TYPE: PAL
OD_SPHERE: +2.50
OD_AXIS: 014
OS_SPHERE: -5.25
OS_CYLINDER: SPHERE
OS_AXIS: 085
SPECS_TYPE: SVL
OD_CYLINDER: +0.50
OD_CYLINDER: SPHERE
OS_ADD: +2.50
OS_CYLINDER: +1.00
OS_SPHERE: +2.50
OD_SPHERE: -3.75
OD_ADD: +2.50

## 2018-11-23 ASSESSMENT — CONF VISUAL FIELD
OS_NORMAL: 1
OD_NORMAL: 1

## 2018-11-23 ASSESSMENT — EXTERNAL EXAM - RIGHT EYE: OD_EXAM: NORMAL

## 2018-11-23 ASSESSMENT — TONOMETRY
OS_IOP_MMHG: 17
OD_IOP_MMHG: 16
IOP_METHOD: APPLANATION
OS_IOP_MMHG: 20
IOP_METHOD: APPLANATION
OD_IOP_MMHG: 19

## 2018-11-23 ASSESSMENT — SLIT LAMP EXAM - LIDS
COMMENTS: NORMAL
COMMENTS: NORMAL

## 2018-11-23 ASSESSMENT — VISUAL ACUITY
METHOD: SNELLEN - LINEAR
OS_CC: 20/25
OD_CC: 20/20
CORRECTION_TYPE: CONTACTS

## 2018-11-23 NOTE — MR AVS SNAPSHOT
After Visit Summary   2018    Alessandra Roman    MRN: [id number]           Patient Information     Date Of Birth          1950        Visit Information        Provider Department      2018 8:40 AM Irena Nobles MD [facility]        Today's Diagnoses     Primary open angle glaucoma (POAG) of both eyes, mild stage - Both Eyes    -  1    Nuclear sclerotic cataract of both eyes - Both Eyes           Follow-ups after your visit        Follow-up notes from your care team     Return in about 6 weeks (around 2019) for follow up- POAG, iop  on new medication.      Your next 10 appointments already scheduled     Dec 21, 2018  8:20 AM CST   Return Visit with Irena Nobles MD   [facility])    [facility]  [address] [address] [phone]              Who to contact     Please call your clinic at [phone] to:    Ask questions about your health    Make or cancel appointments    Discuss your medicines    Learn about your test results    Speak to your doctor            Additional Information About Your Visit        MyChart Information     Optisortt is an electronic gateway that provides easy, online access to your medical records. With Invisalert [employer], you can request a clinic appointment, read your test results, renew a prescription or communicate with your care team.     To sign up for Optisortt visit the website at [email or web address]   You will be asked to enter the access code listed below, as well as some personal information. Please follow the directions to create your username and password.     Your access code is: [email or web address]  Expires: 2019  6:30 AM     Your access code will  in 90 days. If you need help or a new code, please contact your [facility] or call [phone] for  assistance.        Care EveryWhere ID     This is your Care EveryWhere ID. This could be used by other organizations to access your [facility] medical records  [id number]         Blood Pressure from Last 3 Encounters:   No data found for BP    Weight from Last 3 Encounters:   No data found for Wt              We Performed the Following     OCT Optic Nerve RNFL Spectralis OU (both eyes)     OVF 24-2 Dynamic OU          Today's Medication Changes          These changes are accurate as of 11/23/18  4:30 PM.  If you have any questions, ask your nurse or doctor.               Start taking these medicines.        Dose/Directions    latanoprost 0.005 % ophthalmic solution   Commonly known as:  XALATAN   Used for:  Primary open angle glaucoma (POAG) of both eyes, mild stage   Started by:  Irena Nobles MD        Dose:  1 drop   Place 1 drop into both eyes At Bedtime   Quantity:  1 Bottle   Refills:  11            Where to get your medicines      These medications were sent to [facility] [address]  [address] [facility] [address], [facility] [address]     Phone:  [phone]     latanoprost 0.005 % ophthalmic solution                Primary Care Provider Office Phone # Fax #    Donny Siegel [phone]       [address]  [facility] [address]        Equal Access to Services     JOHN PEREZ AH: Hadii cheryl ku hadasho Soomaali, waaxda luqadaha, qaybta kaalmada adeegyada, waxay georgesin haycharissan vishal salomon. So [facility] [phone].    ATENCIÓN: Si habla español, tiene a mckeon disposición servicios gratuitos de asistencia lingüística. Llame al [phone].    We comply with applicable federal civil rights laws and [place] laws. We do not discriminate on the basis of race, color, national origin, age, disability, sex, sexual orientation, or gender identity.            Thank you!     Thank you for choosing [facility]  [facility]  for your care. Our goal is always to provide you with excellent care. Hearing back from our patients is one way we can continue to improve our services. Please take a few minutes to complete the written survey that you may receive in the mail after your visit with us. Thank you!             Your Updated Medication List - Protect others around you: Learn how to safely use, store and throw away your medicines at www.disposemymeds.org.          This list is accurate as of 11/23/18  4:30 PM.  Always use your most recent med list.                   Brand Name Dispense Instructions for use Diagnosis    eszopiclone 2 MG tablet    LUNESTA     Take 2 mg by mouth        HUMALOG SC           latanoprost 0.005 % ophthalmic solution    XALATAN    1 Bottle    Place 1 drop into both eyes At Bedtime    Primary open angle glaucoma (POAG) of both eyes, mild stage       lisinopril 10 MG tablet    PRINIVIL/ZESTRIL     Take 10 mg by mouth        simvastatin 20 MG tablet    ZOCOR     Take 20 mg by mouth        timolol 0.5 % ophthalmic solution    TIMOPTIC    3 Bottle    Place 1 drop into both eyes daily in the morning    Primary open angle glaucoma of both eyes, mild stage       venlafaxine 75 MG 24 hr capsule    EFFEXOR-XR     Take 75 mg by mouth

## 2018-11-23 NOTE — PROGRESS NOTES
HPI  Alessandra Roman is a 68 year old female here for glaucoma follow-up and Octopus Visual Field.  No change in vision.  Tolerating timolol, uses at night because she forgets sometimes in the morning.      PMH:  Diabetes mellitus type 1,  Hypertension  POH:  Glasses for myopia, rigid gas permeable lenses, Primary open angle glaucoma (POAG) diagnosis in 2017, mild cataracts, no surgery, no trauma  Oc Meds:  Timolol both eyes q am   FH:  Denies any glaucoma, age related macular degeneration, or other known eye diseases       Assessment & Plan      (H40.1131) Primary open angle glaucoma (POAG) of both eyes, mild stage  (primary encounter diagnosis)  Comment:  dx 2017 after Octopus Visual Field changes and intraocular pressure 20/19   K pachy:   521/523 (+1 each eye)   Tmax:  20 per old notes     HVF:  Superior arcuate both eyes      CDR:   0.65/0.7  HRT/OCT:     inferior thinning both eyes, s/I left eye   FHX of Glc:  No       Gonio:       Intolerant to:  None    Asthma/COPD: no     Steroid Use:  no     Kidney Stones:  no     Sulfa Allergy:  no   IOP targets: ?   Today's IOP: stable mid teens      Plan: (H40.1131) Primary open angle glaucoma (POAG) of both eyes, mild stage - Both Eyes  (primary encounter diagnosis)  Comment: slightly worse slow progression on oct, Octopus Visual Field psd slightly worse as well but could be normal fluctuation  Plan: latanoprost (XALATAN) 0.005 % ophthalmic         solution, OVF 24-2 Dynamic OU, OCT Optic Nerve         RNFL Spectralis OU (both eyes)        Discussed with patient at length about timolol versus pga including efficacy, time of day dosing, side effect profile (including lash growth and some darkening of the skin around the eye, iris darkening to brown with time, something that is not common but not reversible)    Intraocular pressure check in 1 month on new medication     (H25.13) Nuclear sclerotic cataract of both eyes - Both Eyes  Comment: not visually  significant   Plan: follow       -----------------------------------------------------------------------------------    Patient disposition:   Return in about 6 weeks (around 1/4/2019) for follow up- POAG, iop  on new medication. Call for sooner appointment as needed.    Complete documentation of historical and exam elements from today's encounter can be found in the full encounter summary report (not reduplicated in this progress note). I personally obtained the chief complaint(s) and history of present illness.  I have confirmed and edited as necessary the CC, HPI, PMH/PSH, social history, FMH, ROS, and exam/neuro findings as obtained by the technician or others. I have examined this patient myself and I personally viewed the image(s) and studies listed above and the documentation reflects my findings and interpretation.  I formulated and edited as necessary the assessment and plan and discussed the findings and management plan with the patient and family.     Irena Nobles MD

## 2018-12-21 ENCOUNTER — OFFICE VISIT (OUTPATIENT)
Dept: OPHTHALMOLOGY | Facility: CLINIC | Age: 68
End: 2018-12-21
Payer: COMMERCIAL

## 2018-12-21 DIAGNOSIS — H40.1131 PRIMARY OPEN ANGLE GLAUCOMA (POAG) OF BOTH EYES, MILD STAGE: ICD-10-CM

## 2018-12-21 DIAGNOSIS — H25.13 NUCLEAR SCLEROTIC CATARACT OF BOTH EYES: Primary | ICD-10-CM

## 2018-12-21 RX ORDER — LATANOPROST 50 UG/ML
1 SOLUTION/ DROPS OPHTHALMIC AT BEDTIME
Qty: 7.5 ML | Refills: 3 | Status: SHIPPED | OUTPATIENT
Start: 2018-12-21

## 2018-12-21 ASSESSMENT — TONOMETRY
OD_IOP_MMHG: 15
IOP_METHOD: APPLANATION
OS_IOP_MMHG: 16
OD_IOP_MMHG: 16
OS_IOP_MMHG: 14
IOP_UNABLETOASSESS: 1
IOP_METHOD: TONOPEN

## 2018-12-21 ASSESSMENT — VISUAL ACUITY
OD_CC: 20/25
OD_CC+: -2
CORRECTION_TYPE: CONTACTS
METHOD: SNELLEN - LINEAR
OS_CC: 20/25

## 2018-12-21 ASSESSMENT — SLIT LAMP EXAM - LIDS
COMMENTS: NORMAL
COMMENTS: NORMAL

## 2018-12-21 ASSESSMENT — CONF VISUAL FIELD
OD_NORMAL: 1
OS_NORMAL: 1

## 2018-12-21 ASSESSMENT — EXTERNAL EXAM - LEFT EYE: OS_EXAM: NORMAL

## 2018-12-21 ASSESSMENT — EXTERNAL EXAM - RIGHT EYE: OD_EXAM: NORMAL

## 2018-12-21 NOTE — PROGRESS NOTES
HPI  Alessandra Roman is a 68 year old female here for glaucoma follow-up and IOP check.  No change in vision.  Tolerating latanoprost.    PMH:  Diabetes mellitus type 1,  Hypertension  POH:  Glasses for myopia, rigid gas permeable lenses, Primary open angle glaucoma (POAG) diagnosis in 2017, mild cataracts, no surgery, no trauma  Oc Meds:  Timolol both eyes q am   FH:  Denies any glaucoma, age related macular degeneration, or other known eye diseases       Assessment & Plan      (H40.1131) Primary open angle glaucoma (POAG) of both eyes, mild stage  (primary encounter diagnosis)  Comment:  dx 2017 after [facility] Visual Field changes and intraocular pressure 20/19   K pachy:   521/523 (+1 each eye)   Tmax:  20 per old notes     HVF:  Superior arcuate both eyes      CDR:   0.65/0.7  HRT/OCT:     inferior thinning both eyes, s/I left eye   FHX of Glc:  No       Gonio:       Intolerant to:  None    Asthma/COPD: no     Steroid Use:  no     Kidney Stones:  no     Sulfa Allergy:  no   IOP targets: ?   Today's IOP: stable mid teens newly on pga    Plan: latanoprost (XALATAN) 0.005 % ophthalmic         solution, continue  Patient will take records to new provider when chosen, we will be out of her network 1/1/19    (H25.13) Nuclear sclerotic cataract of both eyes - Both Eyes  Comment: not visually significant   Plan: follow       -----------------------------------------------------------------------------------    Patient disposition:   Return pt changing insurance and provider. Call for sooner appointment as needed.    Complete documentation of historical and exam elements from today's encounter can be found in the full encounter summary report (not reduplicated in this progress note). I personally obtained the chief complaint(s) and history of present illness.  I have confirmed and edited as necessary the CC, HPI, PMH/PSH, social history, FMH, ROS, and exam/neuro findings as obtained by the technician or others. I have  examined this patient myself and I personally viewed the image(s) and studies listed above and the documentation reflects my findings and interpretation.  I formulated and edited as necessary the assessment and plan and discussed the findings and management plan with the patient and family.     Irena Nobles MD

## 2019-08-15 ENCOUNTER — OFFICE VISIT (OUTPATIENT)
Dept: OPHTHALMOLOGY | Facility: CLINIC | Age: 69
End: 2019-08-15

## 2019-08-15 DIAGNOSIS — H52.4 PRESBYOPIA OF BOTH EYES: Primary | ICD-10-CM

## 2019-08-22 ENCOUNTER — TELEPHONE (OUTPATIENT)
Dept: OPTOMETRY | Facility: CLINIC | Age: 69
End: 2019-08-22

## 2019-09-25 NOTE — PROGRESS NOTES
Contact Lens Billing  V-Code:  - GP Spherical, multifocal     # of units: 1  Price per Unit: $150    These are for cosmetic contact lenses.    Encounter Diagnosis   Name Primary?     Presbyopia of both eyes Yes        Left lens ordered 8/15/19

## 2021-08-26 ENCOUNTER — LAB REQUISITION (OUTPATIENT)
Dept: LAB | Facility: CLINIC | Age: 71
End: 2021-08-26

## 2021-08-26 DIAGNOSIS — R09.81 NASAL CONGESTION: ICD-10-CM

## 2021-08-26 PROCEDURE — U0003 INFECTIOUS AGENT DETECTION BY NUCLEIC ACID (DNA OR RNA); SEVERE ACUTE RESPIRATORY SYNDROME CORONAVIRUS 2 (SARS-COV-2) (CORONAVIRUS DISEASE [COVID-19]), AMPLIFIED PROBE TECHNIQUE, MAKING USE OF HIGH THROUGHPUT TECHNOLOGIES AS DESCRIBED BY CMS-2020-01-R: HCPCS | Performed by: NURSE PRACTITIONER

## 2021-08-27 LAB — SARS-COV-2 RNA RESP QL NAA+PROBE: NEGATIVE

## 2022-08-04 ENCOUNTER — TELEPHONE (OUTPATIENT)
Dept: PHARMACY | Facility: CLINIC | Age: 72
End: 2022-08-04

## 2024-11-01 NOTE — PROGRESS NOTES
A/P  1.) Myopia/Presbyopia OU  -Longtime RGP wearer, previously in DVO with good comfort/fit  -Excellent response to RGP MF switch, good overall vision/comfort/fit  -Pt would like slightly sharper distance vision, okay with readers at near for fine print    Order lenses, mail to pt. RTC as needed.    [facility] at [facility]      
5